# Patient Record
Sex: FEMALE | URBAN - METROPOLITAN AREA
[De-identification: names, ages, dates, MRNs, and addresses within clinical notes are randomized per-mention and may not be internally consistent; named-entity substitution may affect disease eponyms.]

---

## 2023-01-31 ENCOUNTER — OFFICE VISIT (OUTPATIENT)
Dept: DENTISTRY | Facility: CLINIC | Age: 23
End: 2023-01-31

## 2023-01-31 DIAGNOSIS — K08.89 PAIN, DENTAL: Primary | ICD-10-CM

## 2023-01-31 RX ORDER — ACETAMINOPHEN 500 MG
500 TABLET ORAL EVERY 6 HOURS PRN
Qty: 40 TABLET | Refills: 0 | Status: SHIPPED | OUTPATIENT
Start: 2023-01-31

## 2023-01-31 RX ORDER — IBUPROFEN 600 MG/1
600 TABLET ORAL EVERY 6 HOURS PRN
Qty: 30 TABLET | Refills: 0 | Status: SHIPPED | OUTPATIENT
Start: 2023-01-31

## 2023-01-31 RX ORDER — AMOXICILLIN 500 MG/1
500 CAPSULE ORAL EVERY 8 HOURS SCHEDULED
Qty: 21 CAPSULE | Refills: 0 | Status: SHIPPED | OUTPATIENT
Start: 2023-01-31 | End: 2023-02-07

## 2023-02-06 NOTE — PROGRESS NOTES
Pt presented to the clinic for limited exam      Med hx reviewed  Vitals updated  PA of UL/LR taken  Several fractured teeth  #13 RCT, fractured -  #18 MO restoration missing/recurrent decay   #19 RC RCT, fractured -  #28 RCT, fractured/recurrent decay extends subgingivally   #29 DO caries     EOE: WNL, no facial swelling   IOE: POOR OH, generalized bleeding, gingival inflammation, heavy plaque accumulation  Pt states generalized pain, no specific area hurts more than others  Wants to check everything out  Pt was prescribed AMOXI 500 mg and recommended to take Tylenol and alternate with Motrin every 6 hrs or as needed  Pt understood  Pt was recommended to come back for comp exam to address all teeth       NV: COMP/FMX

## 2023-03-30 ENCOUNTER — OFFICE VISIT (OUTPATIENT)
Dept: DENTISTRY | Facility: CLINIC | Age: 23
End: 2023-03-30

## 2023-03-30 VITALS — HEART RATE: 109 BPM | DIASTOLIC BLOOD PRESSURE: 70 MMHG | TEMPERATURE: 97.7 F | SYSTOLIC BLOOD PRESSURE: 103 MMHG

## 2023-03-30 DIAGNOSIS — Z01.21 ENCOUNTER FOR DENTAL EXAMINATION AND CLEANING WITH ABNORMAL FINDINGS: ICD-10-CM

## 2023-03-30 DIAGNOSIS — K04.7 DENTAL INFECTION: Primary | ICD-10-CM

## 2023-03-30 RX ORDER — AMOXICILLIN 500 MG/1
500 CAPSULE ORAL EVERY 8 HOURS SCHEDULED
Qty: 21 CAPSULE | Refills: 0 | Status: SHIPPED | OUTPATIENT
Start: 2023-03-30 | End: 2023-04-06

## 2023-03-30 NOTE — DENTAL PROCEDURE DETAILS
Fay Shook presents for a Comprehensive exam  Verbal consent for treatment given in addition to the forms  Reviewed health history - Patient is ASA I  Consents signed: Yes     Perio: Quad #2  Pain Scale: 0  Caries Assessment: High  Radiographs: Complete mouth series     Oral Hygiene instruction reviewed and given  Recommended Hygiene recall visits with the [facility] AND [facility]  LAURA/FMX    Used I Pad translation for [place] #[id number]  Reviewed medical hist with patient  ASA - I  Pts CC - Patient is questioning the treatment that is needed for #28, which is endo treated and fractured below bone  Patient states that she is experiencing off and on sensitivity from the area  Patient was in the clinic on 1/3/23 and prescribed Amoxi  Patient did not  the antibiotics  Another script was sent to be picked up and take as prescribed today  FMX - Obtained  Problems took place with Dexis while obtaining films  Re take Pas that were not readable at NV  Perio charting - 2A  Quad #2 has post 4mm with BOP present  Patient will be returning for scaling in the presence of gingivitis and re evaluated for SRPs  LAURA - [facility] Amoxi script   --Re take Pas  -Take PAN at [place] to evaluate 3rds  May need OS referral  -Decay noted on charting  -Start with EXT of #28 / Pts  CC    NV:Scaling in the presence of gingivitis / PAN at [place] to eval 3rds and referral need be / Pas retaken due to Dexis issues     NV2:EXT #28 / Pts CC  NV2:Start treatment planning

## 2023-06-26 ENCOUNTER — APPOINTMENT (EMERGENCY)
Dept: RADIOLOGY | Facility: HOSPITAL | Age: 23
End: 2023-06-26

## 2023-06-26 ENCOUNTER — HOSPITAL ENCOUNTER (EMERGENCY)
Facility: HOSPITAL | Age: 23
Discharge: HOME | End: 2023-06-26
Attending: EMERGENCY MEDICINE

## 2023-06-26 VITALS
DIASTOLIC BLOOD PRESSURE: 75 MMHG | TEMPERATURE: 98 F | RESPIRATION RATE: 18 BRPM | SYSTOLIC BLOOD PRESSURE: 119 MMHG | HEART RATE: 104 BPM | OXYGEN SATURATION: 100 %

## 2023-06-26 DIAGNOSIS — S93.409A ANKLE SPRAIN: Primary | ICD-10-CM

## 2023-06-26 PROCEDURE — 73610 X-RAY EXAM OF ANKLE: CPT

## 2023-06-26 PROCEDURE — 73630 X-RAY EXAM OF FOOT: CPT

## 2023-06-26 NOTE — ED PROVIDER NOTES
History  Chief Complaint   Patient presents with   • Foot Injury     Pt tripped & hurt L foot, + swelling     24 y/o [place] speaking F presents with left ankle pain after inversion type of injury earlier today  No knee pain  No hip pain  No nausea vomiting  History provided by:  Patient      Prior to Admission Medications   Prescriptions Last Dose Informant Patient Reported? Taking?   acetaminophen (TYLENOL) 500 mg tablet   No No   Sig: Take 1 tablet (500 mg total) by mouth every 6 (six) hours as needed for mild pain   ibuprofen (MOTRIN) 600 mg tablet   No No   Sig: Take 1 tablet (600 mg total) by mouth every 6 (six) hours as needed for mild pain or moderate pain      Facility-Administered Medications: None       No past medical history on file  No past surgical history on file  No family history on file  I have reviewed and agree with the history as documented  E-Cigarette/Vaping     E-Cigarette/Vaping Substances          Review of Systems   Constitutional: Negative for appetite change, chills and fever  HENT: Negative for congestion, drooling, ear discharge, ear pain and sore throat  Eyes: Negative for pain, itching and visual disturbance  Respiratory: Negative for cough and shortness of breath  Cardiovascular: Negative for chest pain and palpitations  Gastrointestinal: Negative for abdominal pain and vomiting  Endocrine: Negative for cold intolerance, heat intolerance, polydipsia, polyphagia and polyuria  Genitourinary: Negative for difficulty urinating, dysuria and hematuria  Musculoskeletal: Negative for arthralgias, back pain, joint swelling, myalgias, neck pain and neck stiffness  Skin: Negative for color change and rash  Allergic/Immunologic: Negative for environmental allergies and food allergies  Neurological: Negative for dizziness, seizures, syncope, facial asymmetry, light-headedness and headaches  Hematological: Negative for adenopathy  Psychiatric/Behavioral: Negative for agitation, confusion and decreased concentration  All other systems reviewed and are negative  Physical Exam  Physical Exam  Vitals and nursing note reviewed  Constitutional:       General: She is not in acute distress  Appearance: She is well-developed  HENT:      Head: Normocephalic and atraumatic  Eyes:      Conjunctiva/sclera: Conjunctivae normal    Cardiovascular:      Rate and Rhythm: Normal rate and regular rhythm  Heart sounds: No murmur heard  Pulmonary:      Effort: Pulmonary effort is normal  No respiratory distress  Breath sounds: Normal breath sounds  Abdominal:      Palpations: Abdomen is soft  Tenderness: There is no abdominal tenderness  Musculoskeletal:         General: No swelling  Cervical back: Neck supple  Comments: Tenderness palpation over anterior part of the left midfoot area  Slight tenderness to palpation on inversion  Skin:     General: Skin is warm and dry  Capillary Refill: Capillary refill takes less than 2 seconds  Neurological:      Mental Status: She is alert  Psychiatric:         Mood and Affect: Mood normal          Vital Signs  ED Triage Vitals [06/26/23 1707]   Temperature Pulse Respirations Blood Pressure SpO2   98 °F (36 7 °C) 104 18 119/75 100 %      Temp src Heart Rate Source Patient Position - Orthostatic VS BP Location FiO2 (%)   -- Monitor Sitting Right arm --      Pain Score       --           Vitals:    06/26/23 1707   BP: 119/75   Pulse: 104   Patient Position - Orthostatic VS: Sitting         Visual Acuity      ED Medications  Medications - No data to display    Diagnostic Studies  Results Reviewed     None                 XR foot 3+ views LEFT    (Results Pending)   XR ankle 3+ vw left    (Results Pending)              Procedures  Procedures         ED Course        Ace wrap was applied  Plan is to discharge with outpatient Ortho follow-up    Patient agreed with the plan verbalized understanding  Extensive return precautions provided  Medical Decision Making  Ace wrap was applied  Plan is to discharge with outpatient Ortho follow-up  Patient agreed with the plan verbalized understanding  Extensive return precautions provided  Amount and/or Complexity of Data Reviewed  External Data Reviewed: radiology  Radiology: ordered  Disposition  Final diagnoses: Ankle sprain     Time reflects when diagnosis was documented in both MDM as applicable and the Disposition within this note     Time User Action Codes Description Comment    6/26/2023  6:52 PM [address]  [S26 124Z] Ankle sprain       ED Disposition     ED Disposition   Discharge    Condition   Stable    Date/Time   Mon Jun 26, 2023  6:52 PM    Comment   Maulik Freeman discharge to home/self care  Follow-up Information     Follow up With Specialties Details Why Contact Info Additional [address] [facility] [place] [facility]   [address] [id number] [address] [facility] [place], [address] [place], [place], [id number] [phone]          Patient's Medications   Discharge Prescriptions    No medications on file       No discharge procedures on file      PDMP Review     None          ED Provider  Electronically Signed by           Hunter Thompson DO  06/26/23 4490

## 2024-07-16 PROBLEM — R21 RASH: Status: ACTIVE | Noted: 2024-07-16

## 2024-07-16 PROBLEM — R07.81 RIB PAIN ON RIGHT SIDE: Status: ACTIVE | Noted: 2024-07-16

## 2024-10-21 ENCOUNTER — OFFICE VISIT (OUTPATIENT)
Dept: OBGYN CLINIC | Facility: CLINIC | Age: 24
End: 2024-10-21

## 2024-10-21 VITALS
WEIGHT: 150.8 LBS | HEART RATE: 108 BPM | HEIGHT: 66 IN | BODY MASS INDEX: 24.23 KG/M2 | SYSTOLIC BLOOD PRESSURE: 119 MMHG | DIASTOLIC BLOOD PRESSURE: 79 MMHG

## 2024-10-21 DIAGNOSIS — Z12.39 ENCOUNTER FOR BREAST CANCER SCREENING USING NON-MAMMOGRAM MODALITY: ICD-10-CM

## 2024-10-21 DIAGNOSIS — Z12.4 CERVICAL CANCER SCREENING: ICD-10-CM

## 2024-10-21 DIAGNOSIS — Z01.419 WOMEN'S ANNUAL ROUTINE GYNECOLOGICAL EXAMINATION: Primary | ICD-10-CM

## 2024-10-21 PROCEDURE — 99385 PREV VISIT NEW AGE 18-39: CPT | Performed by: NURSE PRACTITIONER

## 2024-10-21 PROCEDURE — 88175 CYTOPATH C/V AUTO FLUID REDO: CPT | Performed by: NURSE PRACTITIONER

## 2024-10-21 NOTE — PROGRESS NOTES
"ANNUAL GYNECOLOGICAL EXAMINATION    Ariana Augustine is a 24 y.o. female who presents today for annual GYN exam.  Her last pap smear was performed years ago and result was normal per patient.  She reports no history of abnormal pap smears in her past.  She has not had HIV screening performed.  She reports menses as normal.  Patient's last menstrual period was 10/02/2024 (approximate).  Her general medical history has been reviewed and she reports it as follows:    History reviewed. No pertinent past medical history.  History reviewed. No pertinent surgical history.  OB History          1    Para   1    Term   1            AB        Living   1         SAB        IAB        Ectopic        Multiple        Live Births   1               Social History     Tobacco Use    Smoking status: Never    Smokeless tobacco: Never   Vaping Use    Vaping status: Never Used   Substance Use Topics    Alcohol use: Never    Drug use: Never     Social History     Substance and Sexual Activity   Sexual Activity Yes    Partners: Male    Birth control/protection: None     Cancer-related family history includes Breast cancer in her maternal grandmother.    Current Outpatient Medications   Medication Instructions    acetaminophen (TYLENOL) 500 mg, Oral, Every 6 hours PRN    cetirizine (ZYRTEC) 10 mg, Oral, Daily    ibuprofen (MOTRIN) 600 mg, Oral, Every 6 hours PRN    naproxen (NAPROSYN) 500 mg, Oral, 2 times daily with meals    NON FORMULARY Oral contraceptive pill received from [place]       Review of Systems:  Review of Systems   Constitutional: Negative.    Gastrointestinal: Negative.    Genitourinary: Negative.    Skin: Negative.        Physical Exam:  /79 (BP Location: Right arm, Patient Position: Sitting)   Pulse (!) 108   Ht 5' 6\" (1.676 m)   Wt 68.4 kg (150 lb 12.8 oz)   LMP 10/02/2024 (Approximate)   BMI 24.34 kg/m²   Physical Exam  Constitutional:       General: She is not in acute distress.    "  Appearance: Normal appearance.   Genitourinary:      Vulva and bladder normal.      No lesions in the vagina.      No vaginal erythema or ulceration.        Right Adnexa: not tender and no mass present.     Left Adnexa: not tender and no mass present.     No cervical motion tenderness or lesion.      Uterus is not enlarged or tender.      No uterine mass detected.  Breasts:     Right: No mass, nipple discharge or skin change.      Left: No mass, nipple discharge or skin change.   Cardiovascular:      Rate and Rhythm: Normal rate and regular rhythm.   Pulmonary:      Effort: Pulmonary effort is normal.      Breath sounds: Normal breath sounds.   Abdominal:      General: Abdomen is flat.      Palpations: Abdomen is soft.   Musculoskeletal:      Cervical back: Neck supple.   Neurological:      Mental Status: She is alert.   Skin:     General: Skin is warm and dry.   Psychiatric:         Mood and Affect: Mood normal.         Behavior: Behavior normal.   Vitals reviewed.           Assessment/Plan:   1. Normal well-woman GYN exam.  2. Cervical cancer screening:  Normal cervical exam.  Pap smear done.  Unsure if she received HPV vaccine in the past. Offered vaccine series to patient now and she declines.     3. STD screening:  Patient declines.    4. Breast cancer screening:  Normal breast exam. Reviewed breast self-awareness.   5. Depression Screening: Patient's depression screening was assessed with a PHQ-2 score of 0. Clinically patient does not have depression. No treatment is required.     6. BMI Counseling: Body mass index is 24.34 kg/m². Discussed the patient's BMI with her. The BMI is normal.    7. Contraception:  Doing well on OCPs, she receives this medication from [place] and would like to continue this.    8. Return to office in one year for annual exam or sooner if needed.    Reviewed with patient that test results are available in [facility] immediately, but that they will not necessarily be reviewed by me  immediately.  Explained that I will review results at my earliest opportunity and contact patient appropriately.

## 2024-10-28 ENCOUNTER — TELEPHONE (OUTPATIENT)
Dept: OBGYN CLINIC | Facility: CLINIC | Age: 24
End: 2024-10-28

## 2024-10-28 LAB
LAB AP GYN PRIMARY INTERPRETATION: NORMAL
Lab: NORMAL

## 2024-10-28 NOTE — TELEPHONE ENCOUNTER
Called pt and informed of results pt verbalized understanding.    ----- Message from YARA Castillo sent at 10/28/2024  1:17 PM EDT -----  Please let her know the pap is normal. Thank you!

## 2024-11-07 NOTE — PROGRESS NOTES
Ambulatory Visit  Name: Ariana Augustine      : 2000      MRN: [id number]  Encounter Provider: Wes Thornton MD  Encounter Date: 2024   Encounter department: [facility]    Assessment & Plan  Rib pain on left side  Improved with a short course of NSAIDs, but not resolved as pain comes and goes   No pain at time of visit   No red flags    Plan:  L rib X-ray   Will consider another course of NSAIDs or PT  Follow up in 4 weeks for annual physical   Orders:    XR ribs 2 vw left; Future    Need for hepatitis C screening test    Orders:    Hepatitis C Antibody; Future    Screening for HIV (human immunodeficiency virus)    Orders:    HIV 1/2 AG/AB w Reflex [facility] for 2 yr old and above; Future    Overweight    Orders:    CBC and differential; Future    Comprehensive metabolic panel; Future    Lipid panel; Future    TSH w/Reflex; Future       History of Present Illness     Ariana is a 24 y.o with no significant PMH who presents today for evaluation of left upper abdominal pain for the past 5 months. Patient stated that pain started after falling over her cough about 5 months ago. She was initially seen her and was prescribed a short course of NSAIDs which improved the pain mildly. Patient reports that pain is now sporadic. Pain comes every 3-4 days and last for about 30 minutes and subsides on its own.  Patient can't think of triggers and has no tried anything for the pain other than Naproxen months ago.   Patient denies menstrual problems. Last menstraul period around the end of last month. On OCP (ciclo 21).   Patient doesn't recall any change in her diet recently. No history of abdominal surgery. Denies diarrhea or constipation.     Patient is requesting some lab work to check for her thyroid, anemia and cholesterol as she has not being checked in years. Patient denies history of thyroid disorders, anemia, renal dysfunction or electrolytes abnormalities.  "    She did a pap smear last month at [facility] and it was normal.           Review of Systems   Constitutional:  Negative for chills, fatigue, fever and unexpected weight change.   Eyes:  Negative for visual disturbance.   Respiratory:  Negative for cough, shortness of breath and wheezing.    Cardiovascular:  Negative for chest pain, palpitations and leg swelling.   Endocrine: Negative for cold intolerance, heat intolerance, polydipsia, polyphagia and polyuria.   Genitourinary:  Negative for difficulty urinating, hematuria, menstrual problem, pelvic pain, vaginal bleeding and vaginal discharge.   Musculoskeletal:         Left side rib pain           Objective     /74 (BP Location: Right arm, Patient Position: Sitting, Cuff Size: Standard)   Pulse 98   Temp 98.5 °F (36.9 °C) (Temporal)   Resp 18   Ht 5' 6\" (1.676 m)   Wt 71.7 kg (158 lb)   LMP 10/02/2024 (Approximate)   SpO2 98%   Breastfeeding No   BMI 25.50 kg/m²     Physical Exam  Constitutional:       General: She is not in acute distress.     Appearance: Normal appearance. She is not ill-appearing.   HENT:      Head: Normocephalic and atraumatic.      Right Ear: Tympanic membrane and external ear normal. There is no impacted cerumen.      Left Ear: Tympanic membrane and external ear normal. There is no impacted cerumen.      Nose: Nose normal. No congestion or rhinorrhea.      Mouth/Throat:      Mouth: Mucous membranes are moist.      Pharynx: Oropharynx is clear. No oropharyngeal exudate or posterior oropharyngeal erythema.   Eyes:      General: No scleral icterus.     Conjunctiva/sclera: Conjunctivae normal.   Cardiovascular:      Rate and Rhythm: Normal rate and regular rhythm.      Pulses: Normal pulses.      Heart sounds: Normal heart sounds. No murmur heard.     No gallop.   Pulmonary:      Effort: Pulmonary effort is normal. No respiratory distress.      Breath sounds: Normal breath sounds. No wheezing, rhonchi or rales. "   Chest:      Chest wall: No tenderness.   Abdominal:      General: Bowel sounds are normal. There is no distension.      Palpations: Abdomen is soft. There is no mass.      Tenderness: There is no abdominal tenderness. There is no right CVA tenderness, left CVA tenderness, guarding or rebound.      Hernia: No hernia is present.      Comments: Umbilical piercing    Musculoskeletal:      Right lower leg: No edema.      Left lower leg: No edema.   Skin:     General: Skin is warm and dry.      Capillary Refill: Capillary refill takes less than 2 seconds.      Findings: No bruising or rash.   Neurological:      General: No focal deficit present.      Mental Status: She is alert and oriented to person, place, and time.   Psychiatric:         Mood and Affect: Mood normal.         Behavior: Behavior normal.

## 2024-11-11 ENCOUNTER — OFFICE VISIT (OUTPATIENT)
Dept: FAMILY MEDICINE CLINIC | Facility: CLINIC | Age: 24
End: 2024-11-11

## 2024-11-11 VITALS
TEMPERATURE: 98.5 F | RESPIRATION RATE: 18 BRPM | SYSTOLIC BLOOD PRESSURE: 112 MMHG | HEART RATE: 98 BPM | HEIGHT: 66 IN | BODY MASS INDEX: 25.39 KG/M2 | WEIGHT: 158 LBS | DIASTOLIC BLOOD PRESSURE: 74 MMHG | OXYGEN SATURATION: 98 %

## 2024-11-11 DIAGNOSIS — E66.3 OVERWEIGHT: ICD-10-CM

## 2024-11-11 DIAGNOSIS — R07.81 RIB PAIN ON LEFT SIDE: Primary | ICD-10-CM

## 2024-11-11 DIAGNOSIS — Z11.59 NEED FOR HEPATITIS C SCREENING TEST: ICD-10-CM

## 2024-11-11 DIAGNOSIS — Z11.4 SCREENING FOR HIV (HUMAN IMMUNODEFICIENCY VIRUS): ICD-10-CM

## 2024-11-11 PROCEDURE — 99213 OFFICE O/P EST LOW 20 MIN: CPT | Performed by: FAMILY MEDICINE

## 2024-11-11 NOTE — ASSESSMENT & PLAN NOTE
Improved with a short course of NSAIDs, but not resolved as pain comes and goes   No pain at time of visit   No red flags    Plan:  L rib X-ray   Will consider another course of NSAIDs or PT  Follow up in 4 weeks for annual physical   Orders:    XR ribs 2 vw left; Future

## 2025-01-29 ENCOUNTER — OFFICE VISIT (OUTPATIENT)
Dept: FAMILY MEDICINE CLINIC | Facility: CLINIC | Age: 25
End: 2025-01-29

## 2025-01-29 VITALS
DIASTOLIC BLOOD PRESSURE: 80 MMHG | HEART RATE: 97 BPM | WEIGHT: 157 LBS | SYSTOLIC BLOOD PRESSURE: 120 MMHG | TEMPERATURE: 97.8 F | OXYGEN SATURATION: 99 % | BODY MASS INDEX: 25.23 KG/M2 | RESPIRATION RATE: 16 BRPM | HEIGHT: 66 IN

## 2025-01-29 DIAGNOSIS — Z00.00 ANNUAL PHYSICAL EXAM: Primary | ICD-10-CM

## 2025-01-29 DIAGNOSIS — Z02.4 ENCOUNTER FOR DRIVER'S LICENSE HISTORY AND PHYSICAL: ICD-10-CM

## 2025-01-29 PROCEDURE — 99395 PREV VISIT EST AGE 18-39: CPT | Performed by: FAMILY MEDICINE

## 2025-01-29 NOTE — PROGRESS NOTES
Adult Annual Physical  Name: Ariana Augustine      : 2000      MRN: [id number]  Encounter Provider: Wes Thornton MD  Encounter Date: 2025   Encounter department: [facility]    Assessment & Plan  Annual physical exam  Up-to-date on cervical cancer screening.   Patient is due for HIV and hep C screening.  Orders pending from last visit       Encounter for 's license history and physical  Patient does not appear to have any medical disorder that would prevent him from driving safely  Advised to report back to [facility] immediately if any new conditions or limitations develop.   Discussed importance of seat belt safety  Advised not to use alcohol, drugs, or substances which inhibit ability to operate a vehicle.   Advised not use cell phone or other devices which can distract while operating a vehicle.   Form filled out, signed, and handed back to the patient.       Immunizations and preventive care screenings were discussed with patient today. Appropriate education was printed on patient's after visit summary.    Counseling:  Alcohol/drug use: discussed moderation in alcohol intake, the recommendations for healthy alcohol use, and avoidance of illicit drug use.  Dental Health: discussed importance of regular tooth brushing, flossing, and dental visits.  Sexual health: discussed sexually transmitted diseases, partner selection, use of condoms, avoidance of unintended pregnancy, and contraceptive alternatives.  Exercise: the importance of regular exercise/physical activity was discussed. Recommend exercise 3-5 times per week for at least 30 minutes.     BMI Counseling: Body mass index is 25.34 kg/m². The BMI is above normal. Nutrition recommendations include decreasing portion sizes, consuming healthier snacks and increasing intake of lean protein. Exercise recommendations include exercising 3-5 times per week. No  "pharmacotherapy was ordered. Rationale for BMI follow-up plan is due to patient being overweight or obese.     Depression Screening and Follow-up Plan: Patient was screened for depression during today's encounter. They screened negative with a PHQ-2 score of 0.        History of Present Illness     Adult Annual Physical:  Patient presents for annual physical. Ariana is a 24 y.o female presenting today for annual physical.     Diet and Physical Activity:  - Diet/Nutrition: well balanced diet.  - Exercise: no formal exercise.    Depression Screening:  - PHQ-2 Score: 0    General Health:  - Sleep: > 8 hours of sleep on average.  - Hearing: normal hearing bilateral ears.  - Vision: no vision problems.  - Dental: regular dental visits and brushes teeth twice daily.    /GYN Health:  - Follows with GYN: no.   - Menopause: premenopausal.   - Last menstrual cycle: 1/2/2025.   - History of STDs: no  - Contraception: oral contraceptives.      Advanced Care Planning:  - Has an advanced directive?: no    - Has a durable medical POA?: no    - ACP document given to patient?: no      Review of Systems   Constitutional:  Negative for fever.   HENT:  Negative for ear pain and sore throat.    Eyes:  Negative for visual disturbance.   Respiratory:  Negative for cough and shortness of breath.    Cardiovascular:  Negative for chest pain and palpitations.   Gastrointestinal:  Negative for abdominal pain and vomiting.   Genitourinary:  Negative for dysuria and hematuria.   Musculoskeletal:  Negative for gait problem.   Skin:  Negative for rash.   Neurological:  Negative for seizures, syncope and headaches.   Psychiatric/Behavioral:  Negative for sleep disturbance. The patient is not nervous/anxious.    All other systems reviewed and are negative.        Objective   /80 (BP Location: Right arm, Patient Position: Sitting, Cuff Size: Standard)   Pulse 97   Temp 97.8 °F (36.6 °C) (Temporal)   Resp 16   Ht 5' 6\" (1.676 m)   Wt 71.2 " kg (157 lb)   LMP 01/02/2025   SpO2 99%   BMI 25.34 kg/m²     Physical Exam  Constitutional:       General: She is not in acute distress.     Appearance: She is not ill-appearing.   HENT:      Head: Normocephalic and atraumatic.      Right Ear: Tympanic membrane and external ear normal. There is no impacted cerumen.      Left Ear: Tympanic membrane and external ear normal. There is no impacted cerumen.      Nose: Nose normal.      Mouth/Throat:      Mouth: Mucous membranes are moist.      Pharynx: Oropharynx is clear. No oropharyngeal exudate or posterior oropharyngeal erythema.   Eyes:      General: No scleral icterus.     Conjunctiva/sclera: Conjunctivae normal.   Cardiovascular:      Rate and Rhythm: Normal rate and regular rhythm.      Pulses: Normal pulses.      Heart sounds: No murmur heard.     No gallop.   Pulmonary:      Effort: Pulmonary effort is normal. No respiratory distress.      Breath sounds: No wheezing or rhonchi.   Abdominal:      General: Bowel sounds are normal. There is no distension.      Tenderness: There is no abdominal tenderness. There is no guarding.   Musculoskeletal:      Cervical back: Normal range of motion.      Right lower leg: No edema.      Left lower leg: No edema.   Skin:     General: Skin is warm and dry.      Capillary Refill: Capillary refill takes less than 2 seconds.   Neurological:      General: No focal deficit present.      Mental Status: She is alert and oriented to person, place, and time.      Motor: No weakness.      Gait: Gait normal.   Psychiatric:         Mood and Affect: Mood normal.         Behavior: Behavior normal.

## 2025-08-18 ENCOUNTER — TELEPHONE (OUTPATIENT)
Age: 25
End: 2025-08-18

## 2025-08-19 ENCOUNTER — OFFICE VISIT (OUTPATIENT)
Dept: GASTROENTEROLOGY | Facility: CLINIC | Age: 25
End: 2025-08-19

## 2025-08-19 VITALS
DIASTOLIC BLOOD PRESSURE: 60 MMHG | HEIGHT: 66 IN | SYSTOLIC BLOOD PRESSURE: 98 MMHG | BODY MASS INDEX: 24.91 KG/M2 | HEART RATE: 88 BPM | WEIGHT: 155 LBS

## 2025-08-19 DIAGNOSIS — R10.13 EPIGASTRIC PAIN: Primary | ICD-10-CM

## 2025-08-20 LAB
ERYTHROCYTE [DISTWIDTH] IN BLOOD BY AUTOMATED COUNT: 12.4 % (ref 11.6–15.1)
HCT VFR BLD AUTO: 39.6 % (ref 34.8–46.1)
HGB BLD-MCNC: 13 G/DL (ref 11.5–15.4)
MCH RBC QN AUTO: 30.2 PG (ref 26.8–34.3)
MCHC RBC AUTO-ENTMCNC: 32.8 G/DL (ref 31.4–37.4)
MCV RBC AUTO: 92 FL (ref 82–98)
PLATELET # BLD AUTO: 230 THOUSANDS/UL (ref 149–390)
PMV BLD AUTO: 10.8 FL (ref 8.9–12.7)
RBC # BLD AUTO: 4.31 MILLION/UL (ref 3.81–5.12)
WBC # BLD AUTO: 8.65 THOUSAND/UL (ref 4.31–10.16)

## 2025-08-20 PROCEDURE — 36415 COLL VENOUS BLD VENIPUNCTURE: CPT | Performed by: PHYSICIAN ASSISTANT

## 2025-08-20 PROCEDURE — 85027 COMPLETE CBC AUTOMATED: CPT | Performed by: PHYSICIAN ASSISTANT
